# Patient Record
Sex: MALE | Race: WHITE | NOT HISPANIC OR LATINO | Employment: FULL TIME | URBAN - METROPOLITAN AREA
[De-identification: names, ages, dates, MRNs, and addresses within clinical notes are randomized per-mention and may not be internally consistent; named-entity substitution may affect disease eponyms.]

---

## 2023-10-15 ENCOUNTER — OFFICE VISIT (OUTPATIENT)
Dept: URGENT CARE | Facility: URGENT CARE | Age: 28
End: 2023-10-15
Payer: COMMERCIAL

## 2023-10-15 VITALS
WEIGHT: 200 LBS | DIASTOLIC BLOOD PRESSURE: 95 MMHG | HEART RATE: 111 BPM | RESPIRATION RATE: 18 BRPM | OXYGEN SATURATION: 100 % | SYSTOLIC BLOOD PRESSURE: 162 MMHG | TEMPERATURE: 98.1 F

## 2023-10-15 DIAGNOSIS — L73.9 FOLLICULITIS: ICD-10-CM

## 2023-10-15 DIAGNOSIS — Z11.3 SCREENING EXAMINATION FOR SEXUALLY TRANSMITTED DISEASE: Primary | ICD-10-CM

## 2023-10-15 DIAGNOSIS — R10.32 INGUINAL PAIN, LEFT: ICD-10-CM

## 2023-10-15 LAB
ALBUMIN UR-MCNC: NEGATIVE MG/DL
APPEARANCE UR: CLEAR
BILIRUB UR QL STRIP: NEGATIVE
COLOR UR AUTO: YELLOW
GLUCOSE UR STRIP-MCNC: NEGATIVE MG/DL
HGB UR QL STRIP: NEGATIVE
KETONES UR STRIP-MCNC: NEGATIVE MG/DL
LEUKOCYTE ESTERASE UR QL STRIP: NEGATIVE
NITRATE UR QL: NEGATIVE
PH UR STRIP: 6.5 [PH] (ref 5–7)
SP GR UR STRIP: 1.01 (ref 1–1.03)
UROBILINOGEN UR STRIP-ACNC: 0.2 E.U./DL

## 2023-10-15 PROCEDURE — 99203 OFFICE O/P NEW LOW 30 MIN: CPT | Performed by: FAMILY MEDICINE

## 2023-10-15 PROCEDURE — 87491 CHLMYD TRACH DNA AMP PROBE: CPT | Performed by: FAMILY MEDICINE

## 2023-10-15 PROCEDURE — 81003 URINALYSIS AUTO W/O SCOPE: CPT

## 2023-10-15 PROCEDURE — 87591 N.GONORRHOEAE DNA AMP PROB: CPT | Performed by: FAMILY MEDICINE

## 2023-10-15 RX ORDER — CLINDAMYCIN HCL 300 MG
300 CAPSULE ORAL 4 TIMES DAILY
Qty: 40 CAPSULE | Refills: 0 | Status: SHIPPED | OUTPATIENT
Start: 2023-10-15 | End: 2023-10-25

## 2023-10-15 NOTE — PROGRESS NOTES
SUBJECTIVE: Enzo Alanis is a 27 year old male presenting with a chief complaint of red tender rash bilateral groin.  Onset of symptoms was day(s) ago.      No past medical history on file.  Allergies   Allergen Reactions    Amoxicillin-Pot Clavulanate Nausea and Vomiting and Swelling    Seasonal Allergies      Social History     Tobacco Use    Smoking status: Never    Smokeless tobacco: Never    Tobacco comments:     Mother quit smoking 12/11   Substance Use Topics    Alcohol use: Not on file       ROS:  SKIN: no rash  GI: no vomiting    OBJECTIVE:  BP (!) 162/95   Pulse 111   Temp 98.1  F (36.7  C)   Resp 18   Wt 90.7 kg (200 lb)   SpO2 100% GENERAL APPEARANCE: healthy, alert and no distress  SKIN: pustule and small inguinal lymphnodes      ICD-10-CM    1. Screening examination for sexually transmitted disease  Z11.3 Chlamydia trachomatis PCR - Clinic Collect     Neisseria gonorrhoeae PCR - Clinic Collect      2. Inguinal pain, left  R10.32 UA Macroscopic with reflex to Microscopic and Culture - Lab Collect      3. Folliculitis  L73.9 clindamycin (CLEOCIN) 300 MG capsule          Fluids/Rest, f/u if worse/not any better

## 2023-10-16 LAB
C TRACH DNA SPEC QL NAA+PROBE: NEGATIVE
N GONORRHOEA DNA SPEC QL NAA+PROBE: NEGATIVE

## 2024-03-09 ENCOUNTER — HEALTH MAINTENANCE LETTER (OUTPATIENT)
Age: 29
End: 2024-03-09

## 2025-03-16 ENCOUNTER — HEALTH MAINTENANCE LETTER (OUTPATIENT)
Age: 30
End: 2025-03-16